# Patient Record
Sex: MALE | Race: WHITE | Employment: FULL TIME | URBAN - NONMETROPOLITAN AREA
[De-identification: names, ages, dates, MRNs, and addresses within clinical notes are randomized per-mention and may not be internally consistent; named-entity substitution may affect disease eponyms.]

---

## 2021-02-21 ENCOUNTER — HOSPITAL ENCOUNTER (EMERGENCY)
Age: 46
Discharge: HOME | End: 2021-02-21
Attending: EMERGENCY MEDICINE
Payer: COMMERCIAL

## 2021-02-21 VITALS
WEIGHT: 168.44 LBS | HEIGHT: 67 IN | BODY MASS INDEX: 26.44 KG/M2 | OXYGEN SATURATION: 97 % | RESPIRATION RATE: 16 BRPM | SYSTOLIC BLOOD PRESSURE: 142 MMHG | TEMPERATURE: 99 F | HEART RATE: 93 BPM | DIASTOLIC BLOOD PRESSURE: 79 MMHG

## 2021-02-21 DIAGNOSIS — Z78.9 PROBLEM WITH CONDOM: Primary | ICD-10-CM

## 2021-02-21 PROCEDURE — 99283 EMERGENCY DEPT VISIT LOW MDM: CPT

## 2021-02-21 ASSESSMENT — ENCOUNTER SYMPTOMS
SHORTNESS OF BREATH: 0
TROUBLE SWALLOWING: 0
VOMITING: 0
BACK PAIN: 0
ABDOMINAL PAIN: 0
COUGH: 0
SORE THROAT: 0
DIARRHEA: 0
EYE REDNESS: 0
EYE PAIN: 0
COLOR CHANGE: 0
NAUSEA: 0

## 2021-02-22 NOTE — ED PROVIDER NOTES
history on file. SOCIAL HISTORY       Social History     Socioeconomic History    Marital status: Unknown     Spouse name: Not on file    Number of children: Not on file    Years of education: Not on file    Highest education level: Not on file   Occupational History    Not on file   Social Needs    Financial resource strain: Not on file    Food insecurity     Worry: Not on file     Inability: Not on file    Transportation needs     Medical: Not on file     Non-medical: Not on file   Tobacco Use    Smoking status: Not on file   Substance and Sexual Activity    Alcohol use: Not on file    Drug use: Not on file    Sexual activity: Not on file   Lifestyle    Physical activity     Days per week: Not on file     Minutes per session: Not on file    Stress: Not on file   Relationships    Social connections     Talks on phone: Not on file     Gets together: Not on file     Attends [employer] service: Not on file     Active member of club or organization: Not on file     Attends meetings of clubs or organizations: Not on file     Relationship status: Not on file    Intimate partner violence     Fear of current or ex partner: Not on file     Emotionally abused: Not on file     Physically abused: Not on file     Forced sexual activity: Not on file   Other Topics Concern    Not on file   Social History Narrative    Not on file           PHYSICAL EXAM    (up to 7 for level 4, 8 ormore for level 5)     ED Triage Vitals   BP Temp Temp src Pulse Resp SpO2 Height Weight   -- -- -- -- -- -- -- --       Physical Exam     Physical    Vital signs and nursing notes were reviewed as well as the social, family, and past medical history. Gen.  appearance: Patient is alert and oriented and in no acute distress    Head: Atraumatic, normocephalic    Neck: Supple, trachea/thyroid normal    EENT: PERRLA, EOMI, conjunctiva normal.    Skin: Warm and dry with no rash    Cardiovascular: Heart RRR, no gallops or rubs, no